# Patient Record
Sex: MALE | Race: WHITE
[De-identification: names, ages, dates, MRNs, and addresses within clinical notes are randomized per-mention and may not be internally consistent; named-entity substitution may affect disease eponyms.]

---

## 2020-07-28 ENCOUNTER — HOSPITAL ENCOUNTER (EMERGENCY)
Dept: HOSPITAL 54 - ER | Age: 45
Discharge: HOME | End: 2020-07-28
Payer: COMMERCIAL

## 2020-07-28 VITALS — BODY MASS INDEX: 26.6 KG/M2 | WEIGHT: 190 LBS | HEIGHT: 71 IN

## 2020-07-28 VITALS — SYSTOLIC BLOOD PRESSURE: 132 MMHG | DIASTOLIC BLOOD PRESSURE: 86 MMHG

## 2020-07-28 DIAGNOSIS — S50.12XA: Primary | ICD-10-CM
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
## 2020-07-28 NOTE — NUR
PT IS MEDICALLY CLEARED FOR BOOKING. PT IS RELEASED UNDER THE CARE OF LEANDRAD. ACI 
GIVEN TO PT. PT IS AMBULATORY ON STEADY GAIT.

## 2021-07-16 ENCOUNTER — HOSPITAL ENCOUNTER (EMERGENCY)
Facility: MEDICAL CENTER | Age: 46
End: 2021-07-16
Attending: EMERGENCY MEDICINE
Payer: MEDICAID

## 2021-07-16 VITALS
BODY MASS INDEX: 32.2 KG/M2 | OXYGEN SATURATION: 94 % | TEMPERATURE: 98.5 F | WEIGHT: 230 LBS | HEIGHT: 71 IN | DIASTOLIC BLOOD PRESSURE: 102 MMHG | SYSTOLIC BLOOD PRESSURE: 182 MMHG | RESPIRATION RATE: 16 BRPM | HEART RATE: 93 BPM

## 2021-07-16 DIAGNOSIS — F15.10 METHAMPHETAMINE ABUSE (HCC): ICD-10-CM

## 2021-07-16 DIAGNOSIS — R03.0 ELEVATED BLOOD PRESSURE READING: ICD-10-CM

## 2021-07-16 PROCEDURE — A9270 NON-COVERED ITEM OR SERVICE: HCPCS | Performed by: EMERGENCY MEDICINE

## 2021-07-16 PROCEDURE — 700102 HCHG RX REV CODE 250 W/ 637 OVERRIDE(OP): Performed by: EMERGENCY MEDICINE

## 2021-07-16 PROCEDURE — 99284 EMERGENCY DEPT VISIT MOD MDM: CPT

## 2021-07-16 RX ORDER — LORAZEPAM 1 MG/1
1 TABLET ORAL ONCE
Status: COMPLETED | OUTPATIENT
Start: 2021-07-16 | End: 2021-07-16

## 2021-07-16 RX ORDER — LORAZEPAM 2 MG/ML
1 INJECTION INTRAMUSCULAR ONCE
Status: DISCONTINUED | OUTPATIENT
Start: 2021-07-16 | End: 2021-07-16

## 2021-07-16 RX ADMIN — LORAZEPAM 1 MG: 1 TABLET ORAL at 03:41

## 2021-07-16 RX ADMIN — LORAZEPAM 1 MG: 1 TABLET ORAL at 02:14

## 2021-07-16 ASSESSMENT — ENCOUNTER SYMPTOMS
CONSTITUTIONAL NEGATIVE: 1
EYES NEGATIVE: 1
BRUISES/BLEEDS EASILY: 0
EYE PAIN: 0
SORE THROAT: 0
GASTROINTESTINAL NEGATIVE: 1
ABDOMINAL PAIN: 0
RESPIRATORY NEGATIVE: 1
MYALGIAS: 0
BACK PAIN: 0
HALLUCINATIONS: 0
SEIZURES: 0
WEAKNESS: 0
HEADACHES: 0
FOCAL WEAKNESS: 0
BLOOD IN STOOL: 0
FEVER: 0
CARDIOVASCULAR NEGATIVE: 1
FLANK PAIN: 0
NECK PAIN: 0
SHORTNESS OF BREATH: 0

## 2021-07-16 NOTE — ED TRIAGE NOTES
Patient to room 10. Patient alert and oriented/ calm and cooperative at this. Patient with no complaints at this time

## 2021-07-16 NOTE — ED NOTES
Report called to Rosangela at [facility]. Patient asymptomatic at discharge. Ambulatory with steady gait out of department and escorted to cab back to [facility]

## 2021-07-16 NOTE — ED NOTES
Called [facility], they are ready for patient at any time.     Called [facility] and spoke with Yu to setup transport to [facility]. Since patient isn't on a legal hold, transport was setup through [employer]. ETA for pickup is 0500. [facility] authorization # [id number].

## 2021-07-16 NOTE — ED PROVIDER NOTES
ED Provider Note    CHIEF COMPLAINT  Chief Complaint   Patient presents with   • Hypertension     Patient arrives via EMS from [facility] for medical clearance for hypertension. Denies hx of hypetension. Denies symptoms at this time       HPI  HPI     Patient for medical clearance due to hypertension to [facility].  Patient does admit to using methamphetamines.  No prior diagnosis of hypertension.  Patient with no complaints of pain or headache or chest pain at this time.  Patient given Ativan with improvement in blood pressure.  Patient counseled on refraining from meth use.    Patient denies prior history of elevated blood pressure reading.  Patient reports that he used IV meth several hours prior to arrival to the emergency department.  He states that he would like help for meth/substance abuse.    REVIEW OF SYSTEMS  Review of Systems   Constitutional: Negative.  Negative for fever.   HENT: Negative.  Negative for ear pain and sore throat.    Eyes: Negative.  Negative for pain.   Respiratory: Negative.  Negative for shortness of breath.    Cardiovascular: Negative.  Negative for chest pain.   Gastrointestinal: Negative.  Negative for abdominal pain and blood in stool.   Genitourinary: Negative for dysuria and flank pain.   Musculoskeletal: Negative for back pain, myalgias and neck pain.   Skin: Negative.  Negative for rash.   Neurological: Negative for focal weakness, seizures, weakness and headaches.   Endo/Heme/Allergies: Does not bruise/bleed easily.   Psychiatric/Behavioral: Negative for hallucinations and suicidal ideas.   All other systems reviewed and are negative.      PAST MEDICAL HISTORY       SOCIAL HISTORY  Social History     Tobacco Use   • Smoking status: Current Every Day Smoker     Packs/day: 0.50     Types: Cigarettes   • Smokeless tobacco: Never Used   Substance and Sexual Activity   • Alcohol use: Never   • Drug use: Yes     Comment: meth/heroin   • Sexual activity: Not on file  "      SURGICAL HISTORY  patient denies any surgical history    CURRENT MEDICATIONS  Home Medications     Reviewed by Shiva Gomez R.N. (Registered Nurse) on 07/16/21 at 0124  Med List Status: Complete   Medication Last Dose Status        Patient Ren Taking any Medications                       ALLERGIES  No Known Allergies    PHYSICAL EXAM  VITAL SIGNS: BP (!) 182/102   Pulse 93   Temp 36.9 °C (98.5 °F) (Temporal)   Resp 16   Ht 1.803 m (5' 11\")   Wt 104 kg (230 lb)   SpO2 94%   BMI 32.08 kg/m²  @KENTON[[id number]::@  Pulse ox interpretation: I interpret this pulse ox as normal.    Physical Exam  Vitals and nursing note reviewed.   HENT:      Head: Normocephalic.      Right Ear: External ear normal.      Left Ear: External ear normal.      Mouth/Throat:      Pharynx: No oropharyngeal exudate.   Eyes:      General: No scleral icterus.     Pupils: Pupils are equal, round, and reactive to light.   Cardiovascular:      Rate and Rhythm: Normal rate.   Pulmonary:      Effort: Pulmonary effort is normal. No respiratory distress.   Abdominal:      General: There is no distension.      Tenderness: There is no abdominal tenderness.   Musculoskeletal:         General: No deformity. Normal range of motion.      Cervical back: Normal range of motion.   Skin:     General: Skin is warm and dry.      Findings: No erythema or rash.   Neurological:      Mental Status: He is alert and oriented to person, place, and time.      Coordination: Coordination normal.   Psychiatric:         Mood and Affect: Affect normal.         Judgment: Judgment normal.         DIAGNOSTIC STUDIES / PROCEDURES    LABS/EKG  No results found for this or any previous visit.    RADIOLOGY  No orders to display        COURSE & MEDICAL DECISION MAKING  Pertinent Labs & Imaging studies reviewed by me. (See chart for details)  I verified that the patient was wearing a mask and I was wearing appropriate PPE every time I entered the room. The patient's mask was on " the patient at all times during my encounter except for a brief view of the oropharynx.     46 y.o. male PMH meth use p/w medical clearance and elevated blood pressure.     Differential diagnosis includes but is not limited to:  Given patient with history of meth use and significantly elevated blood pressure at this time patient with improvement in blood pressure after giving Ativan.  I do not believe patient would benefit from antihypertensive medications at this time as he has recently used meth.    Patient given 2 doses of p.o. Ativan prior to discharge from emergency department.  Patient with significant elevation in blood pressure however given methamphetamine usage shortly prior to arrival I do not believe antihypertensive medication is warranted at this time.    I instructed patient to call to schedule close follow-up appointment with primary care physician within the next week for repeat blood pressure check and if still elevated may consider starting medications at this time.  I discussed with patient location of clean needle program/needle exchange programs at [facility] and also discussed the importance of meth cessation.      Patient will need to follow-up with primary care regarding close follow up      FINAL IMPRESSION  Visit Diagnoses     ICD-10-CM   1. Methamphetamine abuse (HCC)  F15.10   2. Elevated blood pressure reading  R03.0              Electronically signed by: Ivan Ha M.D., 7/16/2021 2:45 AM

## 2022-07-02 ENCOUNTER — HOSPITAL ENCOUNTER (INPATIENT)
Dept: HOSPITAL 54 - ER | Age: 47
LOS: 2 days | Discharge: LEFT BEFORE DISCHARGE | DRG: 812 | End: 2022-07-04
Attending: INTERNAL MEDICINE | Admitting: NURSE PRACTITIONER
Payer: MEDICAID

## 2022-07-02 VITALS — DIASTOLIC BLOOD PRESSURE: 72 MMHG | SYSTOLIC BLOOD PRESSURE: 118 MMHG

## 2022-07-02 VITALS — DIASTOLIC BLOOD PRESSURE: 74 MMHG | SYSTOLIC BLOOD PRESSURE: 117 MMHG

## 2022-07-02 VITALS — WEIGHT: 190 LBS | HEIGHT: 70 IN | BODY MASS INDEX: 27.2 KG/M2

## 2022-07-02 VITALS — DIASTOLIC BLOOD PRESSURE: 74 MMHG | SYSTOLIC BLOOD PRESSURE: 144 MMHG

## 2022-07-02 DIAGNOSIS — D72.829: ICD-10-CM

## 2022-07-02 DIAGNOSIS — L03.113: ICD-10-CM

## 2022-07-02 DIAGNOSIS — J96.02: ICD-10-CM

## 2022-07-02 DIAGNOSIS — G92.8: ICD-10-CM

## 2022-07-02 DIAGNOSIS — A41.9: ICD-10-CM

## 2022-07-02 DIAGNOSIS — N17.0: ICD-10-CM

## 2022-07-02 DIAGNOSIS — T43.621A: ICD-10-CM

## 2022-07-02 DIAGNOSIS — F17.200: ICD-10-CM

## 2022-07-02 DIAGNOSIS — T40.411A: Primary | ICD-10-CM

## 2022-07-02 DIAGNOSIS — F41.9: ICD-10-CM

## 2022-07-02 DIAGNOSIS — J96.01: ICD-10-CM

## 2022-07-02 DIAGNOSIS — J69.0: ICD-10-CM
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
## 2022-07-02 DIAGNOSIS — F32.A: ICD-10-CM

## 2022-07-02 DIAGNOSIS — Z20.822: ICD-10-CM

## 2022-07-02 DIAGNOSIS — F29: ICD-10-CM

## 2022-07-02 LAB
ALBUMIN SERPL BCP-MCNC: 2.8 G/DL (ref 3.4–5)
ALP SERPL-CCNC: 52 U/L (ref 46–116)
ALT SERPL W P-5'-P-CCNC: 41 U/L (ref 12–78)
APAP SERPL-MCNC: < 0 UG/ML (ref 10–30)
AST SERPL W P-5'-P-CCNC: 37 U/L (ref 15–37)
BASE EXCESS BLDA CALC-SCNC: -2.3 MMOL/L
BASE EXCESS BLDA CALC-SCNC: 0.4 MMOL/L
BASOPHILS # BLD AUTO: 0 K/UL (ref 0–0.2)
BASOPHILS NFR BLD AUTO: 0.2 % (ref 0–2)
BILIRUB DIRECT SERPL-MCNC: 0.1 MG/DL (ref 0–0.2)
BILIRUB SERPL-MCNC: 0.4 MG/DL (ref 0.2–1)
BILIRUB UR QL STRIP: NEGATIVE
BUN SERPL-MCNC: 17 MG/DL (ref 7–18)
CALCIUM SERPL-MCNC: 8.3 MG/DL (ref 8.5–10.1)
CHLORIDE SERPL-SCNC: 103 MMOL/L (ref 98–107)
CO2 SERPL-SCNC: 26 MMOL/L (ref 21–32)
COLOR UR: YELLOW
CREAT SERPL-MCNC: 1.8 MG/DL (ref 0.6–1.3)
EOSINOPHIL NFR BLD AUTO: 0.4 % (ref 0–6)
ETHANOL SERPL-MCNC: < 3 MG/DL (ref 0–0)
GLUCOSE SERPL-MCNC: 164 MG/DL (ref 74–106)
GLUCOSE UR STRIP-MCNC: (no result) MG/DL
HCT VFR BLD AUTO: 42 % (ref 39–51)
HGB BLD-MCNC: 13.7 G/DL (ref 13.5–17.5)
INHALED O2 CONCENTRATION: 100 %
INHALED O2 CONCENTRATION: 70 %
INHALED O2 FLOW RATE: 60 L/MIN (ref 0–30)
LEUKOCYTE ESTERASE UR QL STRIP: NEGATIVE
LYMPHOCYTES NFR BLD AUTO: 0.8 K/UL (ref 0.8–4.8)
LYMPHOCYTES NFR BLD AUTO: 5.5 % (ref 20–44)
MCHC RBC AUTO-ENTMCNC: 33 G/DL (ref 31–36)
MCV RBC AUTO: 92 FL (ref 80–96)
MONOCYTES NFR BLD AUTO: 0.4 K/UL (ref 0.1–1.3)
MONOCYTES NFR BLD AUTO: 2.7 % (ref 2–12)
NEUTROPHILS # BLD AUTO: 12.8 K/UL (ref 1.8–8.9)
NEUTROPHILS NFR BLD AUTO: 91.2 % (ref 43–81)
NITRITE UR QL STRIP: NEGATIVE
PCO2 TEMP ADJ BLDA: 49.4 MMHG (ref 35–45)
PCO2 TEMP ADJ BLDA: 51.8 MMHG (ref 35–45)
PH TEMP ADJ BLDA: 7.31 [PH] (ref 7.35–7.45)
PH TEMP ADJ BLDA: 7.34 [PH] (ref 7.35–7.45)
PH UR STRIP: 6.5 [PH] (ref 5–8)
PLATELET # BLD AUTO: 408 K/UL (ref 150–450)
PO2 TEMP ADJ BLDA: 114.3 MMHG (ref 75–100)
PO2 TEMP ADJ BLDA: 88.3 MMHG (ref 75–100)
POTASSIUM SERPL-SCNC: 3.9 MMOL/L (ref 3.5–5.1)
PROT SERPL-MCNC: 7.1 G/DL (ref 6.4–8.2)
PROT UR QL STRIP: 100 MG/DL
RBC # BLD AUTO: 4.53 MIL/UL (ref 4.5–6)
RBC #/AREA URNS HPF: (no result) /HPF (ref 0–2)
SODIUM SERPL-SCNC: 138 MMOL/L (ref 136–145)
UROBILINOGEN UR STRIP-MCNC: 0.2 EU/DL
VENTILATION MODE VENT: (no result)
WBC #/AREA URNS HPF: (no result) /HPF (ref 0–3)
WBC NRBC COR # BLD AUTO: 14 K/UL (ref 4.3–11)

## 2022-07-02 PROCEDURE — C9803 HOPD COVID-19 SPEC COLLECT: HCPCS

## 2022-07-02 PROCEDURE — C9113 INJ PANTOPRAZOLE SODIUM, VIA: HCPCS

## 2022-07-02 PROCEDURE — G0480 DRUG TEST DEF 1-7 CLASSES: HCPCS

## 2022-07-02 PROCEDURE — G0378 HOSPITAL OBSERVATION PER HR: HCPCS

## 2022-07-02 RX ADMIN — ENOXAPARIN SODIUM SCH MG: 40 INJECTION SUBCUTANEOUS at 20:38

## 2022-07-02 RX ADMIN — SODIUM CHLORIDE PRN MLS/HR: 9 INJECTION, SOLUTION INTRAVENOUS at 20:39

## 2022-07-02 RX ADMIN — SODIUM CHLORIDE SCH MG: 9 INJECTION, SOLUTION INTRAVENOUS at 20:38

## 2022-07-02 NOTE — NUR
RT NOTE



PLACED PT ON HIGH FLOW NASAL CANNULA 60L, 100% PER MD ORDER. MONITORING PATIENT CLOSELY FOR 
ANY CHANGES. RN NOTIFIED AND AWARE.

## 2022-07-02 NOTE — NUR
-------------------------------------------------------------------------------

          *** Note undone in ED - 07/02/22 at 1949 by JENNIFER ***           

-------------------------------------------------------------------------------

Patient discharged to home in stable condition. Written and verbal after care 
instructions given. Patient verbalizes understanding of instruction.

## 2022-07-02 NOTE — NUR
RT NOTE



POST ABG RESULTS SHOWN TO DR. CHAN. NO CHANGES AT THIS TIME. WILL CONTINUE TO MONITOR THE 
PATIENT CLOSELY. SPO2 98%. RN AWARE.

## 2022-07-02 NOTE — NUR
RECEIVED PATIENT AWAKE, ORIENTED X4. HE  CANNOT RECALL WHAT HAPPENED TO HIM 
EARLIER. HE WANTS TO GO OUT. EXPLAINED TO HIM THE SEVERITY OF HIS CONDITION AT 
THE MOMENT. THEN HE DECIDED TO STAY. VITALS CHECKED.

## 2022-07-02 NOTE — NUR
-------------------------------------------------------------------------------

          *** Note undone in ED - 07/02/22 at 1950 by JENNIFER ***           

-------------------------------------------------------------------------------

RECEIVED PATIENT AWAKE, ALERT, ORIENTED. WITH ONGOING DOPPLER KONSTANTIN OF LOWER LEG.

## 2022-07-02 NOTE — NUR
MIKE FROM DRUG HOUSE FOR OVERDOSE, GOT 12MG IN, 2MG IM, AND 2MG IV NARCAN 
PTA. THE PATIENT IS ALERT AND ORIENTED X3. IN ROOM AIR AND OXYGEN SATURATION 
LEVEL IN ROOM AIR IS AT 95%. THE PATIENT IS ATTACHED TO THE MONITOR.

WILL CONTINUE TO MONITOR THE PATIENT.

## 2022-07-02 NOTE — NUR
DR CHAN MADE AWARE OF OXYGEN SATURATION LEVEL OF 55 % IN ROOM AIR. THE 
PATIENT IS PLACED ON OXYGEN AT 15L/MIN VIA NON-REBREATHER MASK AND MADE DR CHAN AWARE.

THE SATURATION LEVEL WITH 15L/MIN VIA NON-REBREATHER MASK IMPORVED TO 92%.

WILL CONTINUE TO MONITOR THE PATIENT.

## 2022-07-02 NOTE — NUR
RN NOTE



RECEIVED A 47 YEAR OLD MALE FROM ER. PATIENT ARRIVED VIA GURNEY. PATIENT ALERT AND ORIENTED 
X4. ABLE TO MAKE NEEDS KNOWN. ARRIVE WITH NRB AT 15L/MIN, SWITCHED TO HIGH FLOW NC AT 
60L/MIN AND 60 PERCENT FIO2. TOLERATING WELL WITH O2 SATURATION  PERCENT WITH GOOD 
WAVEFORM VIA BEDSIDE MONITOR. PATIENT DENIES FEELING SOB AT THIS TIME. ON TELE MONITORING. 
PATIENT DENIES CHEST PAIN. SKIN IS WARM AND DRY TO TOUCH. LEFT AND RIGHT HAND 2OG, AND RIGHT 
FOREARM 18G. INTACT. DENIES N/V. SKIN ASSESSMENT DONE. PICTURES FILED IN CHART. PATIENT 
STATES HE CAN CONTROL BOTH HIS BOWEL AND BLADDER. PROVIDED WITH URINAL. ALL NEEDS ATTENDED. 
BED LOW, IN LOCKED POSITION, CALL LIGHT WITHIN REACH.

## 2022-07-03 VITALS — SYSTOLIC BLOOD PRESSURE: 133 MMHG | DIASTOLIC BLOOD PRESSURE: 70 MMHG

## 2022-07-03 VITALS — DIASTOLIC BLOOD PRESSURE: 72 MMHG | SYSTOLIC BLOOD PRESSURE: 117 MMHG

## 2022-07-03 VITALS — DIASTOLIC BLOOD PRESSURE: 66 MMHG | SYSTOLIC BLOOD PRESSURE: 120 MMHG

## 2022-07-03 VITALS — DIASTOLIC BLOOD PRESSURE: 73 MMHG | SYSTOLIC BLOOD PRESSURE: 124 MMHG

## 2022-07-03 VITALS — SYSTOLIC BLOOD PRESSURE: 111 MMHG | DIASTOLIC BLOOD PRESSURE: 59 MMHG

## 2022-07-03 VITALS — DIASTOLIC BLOOD PRESSURE: 70 MMHG | SYSTOLIC BLOOD PRESSURE: 114 MMHG

## 2022-07-03 VITALS — SYSTOLIC BLOOD PRESSURE: 119 MMHG | DIASTOLIC BLOOD PRESSURE: 65 MMHG

## 2022-07-03 VITALS — SYSTOLIC BLOOD PRESSURE: 117 MMHG | DIASTOLIC BLOOD PRESSURE: 62 MMHG

## 2022-07-03 VITALS — DIASTOLIC BLOOD PRESSURE: 86 MMHG | SYSTOLIC BLOOD PRESSURE: 119 MMHG

## 2022-07-03 VITALS — SYSTOLIC BLOOD PRESSURE: 114 MMHG | DIASTOLIC BLOOD PRESSURE: 64 MMHG

## 2022-07-03 VITALS — SYSTOLIC BLOOD PRESSURE: 120 MMHG | DIASTOLIC BLOOD PRESSURE: 72 MMHG

## 2022-07-03 VITALS — SYSTOLIC BLOOD PRESSURE: 127 MMHG | DIASTOLIC BLOOD PRESSURE: 63 MMHG

## 2022-07-03 VITALS — SYSTOLIC BLOOD PRESSURE: 118 MMHG | DIASTOLIC BLOOD PRESSURE: 64 MMHG

## 2022-07-03 VITALS — SYSTOLIC BLOOD PRESSURE: 125 MMHG | DIASTOLIC BLOOD PRESSURE: 70 MMHG

## 2022-07-03 VITALS — SYSTOLIC BLOOD PRESSURE: 126 MMHG | DIASTOLIC BLOOD PRESSURE: 64 MMHG

## 2022-07-03 VITALS — SYSTOLIC BLOOD PRESSURE: 117 MMHG | DIASTOLIC BLOOD PRESSURE: 72 MMHG

## 2022-07-03 VITALS — DIASTOLIC BLOOD PRESSURE: 72 MMHG | SYSTOLIC BLOOD PRESSURE: 135 MMHG

## 2022-07-03 LAB
BASOPHILS # BLD AUTO: 0.1 K/UL (ref 0–0.2)
BASOPHILS NFR BLD AUTO: 0.5 % (ref 0–2)
BUN SERPL-MCNC: 21 MG/DL (ref 7–18)
CALCIUM SERPL-MCNC: 8.2 MG/DL (ref 8.5–10.1)
CHLORIDE SERPL-SCNC: 103 MMOL/L (ref 98–107)
CO2 SERPL-SCNC: 30 MMOL/L (ref 21–32)
CREAT SERPL-MCNC: 1.5 MG/DL (ref 0.6–1.3)
EOSINOPHIL NFR BLD AUTO: 0.1 % (ref 0–6)
GLUCOSE SERPL-MCNC: 95 MG/DL (ref 74–106)
HCT VFR BLD AUTO: 40 % (ref 39–51)
HGB BLD-MCNC: 13.2 G/DL (ref 13.5–17.5)
LYMPHOCYTES NFR BLD AUTO: 1.3 K/UL (ref 0.8–4.8)
LYMPHOCYTES NFR BLD AUTO: 7.6 % (ref 20–44)
MAGNESIUM SERPL-MCNC: 2.1 MG/DL (ref 1.8–2.4)
MCHC RBC AUTO-ENTMCNC: 33 G/DL (ref 31–36)
MCV RBC AUTO: 92 FL (ref 80–96)
MONOCYTES NFR BLD AUTO: 0.7 K/UL (ref 0.1–1.3)
MONOCYTES NFR BLD AUTO: 3.9 % (ref 2–12)
NEUTROPHILS # BLD AUTO: 15.1 K/UL (ref 1.8–8.9)
NEUTROPHILS NFR BLD AUTO: 87.9 % (ref 43–81)
PHOSPHATE SERPL-MCNC: 4.3 MG/DL (ref 2.5–4.9)
PLATELET # BLD AUTO: 393 K/UL (ref 150–450)
POTASSIUM SERPL-SCNC: 4.6 MMOL/L (ref 3.5–5.1)
RBC # BLD AUTO: 4.31 MIL/UL (ref 4.5–6)
SODIUM SERPL-SCNC: 139 MMOL/L (ref 136–145)
WBC NRBC COR # BLD AUTO: 17.1 K/UL (ref 4.3–11)

## 2022-07-03 RX ADMIN — ENOXAPARIN SODIUM SCH MG: 40 INJECTION SUBCUTANEOUS at 21:17

## 2022-07-03 RX ADMIN — DEXTROSE MONOHYDRATE SCH MLS/HR: 50 INJECTION, SOLUTION INTRAVENOUS at 11:24

## 2022-07-03 RX ADMIN — CEFEPIME HYDROCHLORIDE SCH MLS/HR: 1 INJECTION, POWDER, FOR SOLUTION INTRAMUSCULAR; INTRAVENOUS at 08:06

## 2022-07-03 RX ADMIN — SODIUM CHLORIDE SCH MG: 9 INJECTION, SOLUTION INTRAVENOUS at 08:05

## 2022-07-03 RX ADMIN — SODIUM CHLORIDE PRN MLS/HR: 9 INJECTION, SOLUTION INTRAVENOUS at 17:11

## 2022-07-03 RX ADMIN — DEXTROSE MONOHYDRATE SCH MLS/HR: 50 INJECTION, SOLUTION INTRAVENOUS at 21:58

## 2022-07-03 RX ADMIN — CEFEPIME HYDROCHLORIDE SCH MLS/HR: 1 INJECTION, POWDER, FOR SOLUTION INTRAMUSCULAR; INTRAVENOUS at 21:15

## 2022-07-03 NOTE — NUR
RN CLOSING NOTE 



PATIENT IS ALERT AND ORIENTEDX4. PATIENT IN SEMI FOWLERS POSITION. PATIENT SHOWS NO SIGNS OR 
SYMPTOMS OF PAIN OR DISTRESS. PATIENT OXYGEN SATURATION 96% VIA NASAL CANNULA. SAFETY 
MEASURES IN PLACE, BED LOCKED AND IN LOWEST POSITION, SIDE RAILS X2, CALL LIGHT WITHIN 
REACH.

## 2022-07-03 NOTE — NUR
RN NOTES



PT FOUND SEMI FOWLERS, SLEEPING (AUDIBLY SNORING) DISPLAYING NO S/S OF DISTRESS, FLACC = 0 
AND BREATHING IS EVEN AND UNLABORED ON 5L O2 NC. R FA 18G IS PATIENT AND INTACT. RN WILL 
CONTINUE CARE PLAN AND ANTICIPATE NEEDS. SAFETY MEASURES IN PLACE, BED LOCKED AND IN LOWEST 
POSITION, SIDE RAILS UPX2, CALL LIGHT WITHIN REACH, BED ALARM ARMED.

## 2022-07-03 NOTE — NUR
tele rn note 

received patient from icu alert oriented on tele monitor sr , on ivf as ordered on 5l nc no 
sob noted at this time, bed in lowest and locked position , call light within reach

## 2022-07-03 NOTE — NUR
MD VISIT



DR BARRIENTOS VISITED PT, PERFORMED ASSESSMENT, ASKED QUESTIONS. MD GAVE ORDERS: REGULAR 
DIET. RN ACKNOWLEDGED AND WILL ENTER ORDERS.

## 2022-07-03 NOTE — NUR
TRANSFER



PT DOWNGRADED TO TELE, PT IS A&OX4, BREATHING EVEN AND UNLABORED ON 4L O2 NC. PT CURRENTLY 
ENDORSES NO PAIN. PT TRANSPORTED W/ TOO MERAZ WHILE ON PORTABLE BEDSIDE MONITOR. VS WERE WNL. 
SBAR AND REPORT GIVEN TO TOO PASCUAL. CHART DELIVERED TO UNIT SECRETARY. BELONGINGS REVIEWED 
AND BROUGHT. PT PLACED ON TELE MONITOR BY PRIMARY RN. PT ENDORSED IN STABLE CONDITION FOR 
KYLIE.

## 2022-07-03 NOTE — NUR
Tele RN Opening Note

Pt received in bed, awake and watching TV, A&O x4, calm, cooperative. Pt is on 5L NC with 
current O2sat of 98%; pt shows no s/s of resp distress, no SOB or cough, non-labored and 
equal breathing; pt appears comfortable overall. Pt attached to external monitor SR with HR 
of 73. IV access noted to be on right hand 20G with NS at 75 ml/hr; IV is intact and patent, 
flushes easily with no resistance. Will monitor pt for any s/s of drug withdrawal. Bed in 
lowest position, call light within reach, side rails up x2. Will continue to monitor 
throughout the night.

## 2022-07-03 NOTE — NUR
RT Notes



Pt received on High Flow nasal cannula on flow 60LPM and FIO2 of 100% and titrated down 
throughout shift to flow 50LPM and FIO2 50%. No SOB/distress noted throughout shift. [place] 
at bedside. Will cont to monitor.

## 2022-07-04 VITALS — SYSTOLIC BLOOD PRESSURE: 130 MMHG | DIASTOLIC BLOOD PRESSURE: 61 MMHG

## 2022-07-04 VITALS — SYSTOLIC BLOOD PRESSURE: 126 MMHG | DIASTOLIC BLOOD PRESSURE: 71 MMHG

## 2022-07-04 VITALS — SYSTOLIC BLOOD PRESSURE: 136 MMHG | DIASTOLIC BLOOD PRESSURE: 76 MMHG

## 2022-07-04 VITALS — DIASTOLIC BLOOD PRESSURE: 73 MMHG | SYSTOLIC BLOOD PRESSURE: 115 MMHG

## 2022-07-04 RX ADMIN — CEFEPIME HYDROCHLORIDE SCH MLS/HR: 1 INJECTION, POWDER, FOR SOLUTION INTRAMUSCULAR; INTRAVENOUS at 09:50

## 2022-07-04 RX ADMIN — SODIUM CHLORIDE PRN MLS/HR: 9 INJECTION, SOLUTION INTRAVENOUS at 04:40

## 2022-07-04 RX ADMIN — DEXTROSE MONOHYDRATE SCH MLS/HR: 50 INJECTION, SOLUTION INTRAVENOUS at 10:28

## 2022-07-04 RX ADMIN — SODIUM CHLORIDE SCH MG: 9 INJECTION, SOLUTION INTRAVENOUS at 09:50

## 2022-07-04 NOTE — NUR
TELE RN OPENING NOTE



PATIENT IN BED ASLEEP, BUT EASILY AROUSABLE, ALERT AND ORIENTED X4, CALM, COOPERATIVE; SLEPT 
WELL DURING THE NIGHT. PATIENT REMAINS ON 5L NC WITH O2SAT RANGING FROM 90%-100% WITH NO 
SIGNS OR SYMPTOMS OF RESPIRATORY DISTRESS, NON-LABORED AND EQUAL BREATHING, NO SHORTNESS OF 
BREATH. ATTACHED TO EXTERNAL MONITOR, READING SINUS RHYTHM RANGING FROM 73-82 BEATS PER 
MINUTE. IV ACCESS NOTED ON RIGHT FOREARM  18 GAUGE, AND LEFT AND RIGHT HANDS 20 GAUGE, 
INTACT AND PATENT. RIGHT HAND RUNNING NORMAL SALINE AT 75 ML/HR. BED IN LOWEST POSITION, 
CALL LIGHT WITHIN REACH, SIDE RAILS UP X2. WILL CONTINUE PLAN OF CARE AND ANTICIPATE NEEDS.

## 2022-07-04 NOTE — NUR
RN NOTE



PATIENT REFUSING BLOOD DRAW FOR LABORATORY. PER PHLEBOTOMIST, "WE WILL GIVE HIM SOME TIME 
BEFORE WE ASK TO TAKE BLOOD AGAIN."WILL CONTINUE PLAN OF CARE AND ANTICIPATE NEEDS.

## 2022-07-04 NOTE — NUR
TELE RN CLOSING NOTE

PT IN BED ASLEEP, BUT EASILY AROUSABLE, A&OX4, CALM, COOPERATIVE; SLEPT WELL DURING THE 
NIGHT. PT REMAINS ON 5L NC WITH O2SAT RANGING FROM 90%-100% LAST NIGHT WITH NO S/S OF RESP 
DISTRESS, NON-LABORED AND EQUAL BREATHING, NO SOB. ATTACHED TO EXTERNAL MONITOR, SR DURING 
THE NIGHT WITH HR RANGING FROM 73-82. PT'S IV ACCESS RFA 18G, LEFT AND RIGHT HAND 20G, 
INTACT AND PATENT. RIGHT HAND HAS NS RUNNING AT 75 ML/HR. ALL DUE MEDS AND FLUIDS 
ADMINISTERED DURING THE NIGHT. BED IN LOWEST POSITION, CALL LIGHT WITHIN REACH, SIDE RAILS 
UP X2. WILL ENDORSE TO DAYSHIFT NURSE TO CONTINUE CARE.

## 2022-07-04 NOTE — NUR
RN NOTE



PATIENT HAS DECIDED TO LEAVE THE HOSPITAL AGAINST MEDICAL ADVICE. EXPLAINED TO THE PATIENT 
THE RISKS OF ENDING TREATMENT EARLY. PATIENT SAID HE "DOES NOT HAVE TIME" TO GET TREATMENT 
AND HAS "WORK EARLY IN THE MORNING." PATIENT SIGNED AMA FORM, ALL BELONGINGS ACCOUNTED FOR. 
IV ACCESS ON RIGHT AND LEFT HANDS AND RIGHT FOREARM HAVE ALL BEEN DISCONTINUED, NO BLEEDING. 
VITAL SIGNS AT 1200 HOURS WERE AS FOLLOWS



TEMPERATURE: 98.6 DEGREES FARENHEIT

HEART RATE: 79 BEATS PER MINUTE

RESPIRATORY RATE: 18 BREATHS PER MINUTE

O2 SATURATION: 99%

BLOOD PRESSURE: 136/76

PAIN SCALE: 0/10



PATIENT WAS WHEELED TO THE FRONT LOBBY WHERE HE LEFT THE FACILITY SAFELY IN A PRIVATE 
VEHICLE.

## 2022-07-04 NOTE — NUR
RN Note

Pt noted to have temperature of 100.1. Pt administered Tylenol 650 mg. Will monitor for 
effectiveness.